# Patient Record
Sex: FEMALE | Race: OTHER | NOT HISPANIC OR LATINO | URBAN - METROPOLITAN AREA
[De-identification: names, ages, dates, MRNs, and addresses within clinical notes are randomized per-mention and may not be internally consistent; named-entity substitution may affect disease eponyms.]

---

## 2023-09-30 ENCOUNTER — EMERGENCY (EMERGENCY)
Facility: HOSPITAL | Age: 33
LOS: 1 days | Discharge: HOME | End: 2023-09-30
Attending: EMERGENCY MEDICINE | Admitting: EMERGENCY MEDICINE
Payer: MEDICAID

## 2023-09-30 VITALS
RESPIRATION RATE: 18 BRPM | TEMPERATURE: 99 F | OXYGEN SATURATION: 100 % | SYSTOLIC BLOOD PRESSURE: 117 MMHG | DIASTOLIC BLOOD PRESSURE: 56 MMHG | HEART RATE: 79 BPM

## 2023-09-30 LAB
ALBUMIN SERPL ELPH-MCNC: 4.2 G/DL — SIGNIFICANT CHANGE UP (ref 3.3–5)
ALP SERPL-CCNC: 48 U/L — SIGNIFICANT CHANGE UP (ref 40–120)
ALT FLD-CCNC: 8 U/L — SIGNIFICANT CHANGE UP (ref 4–33)
ANION GAP SERPL CALC-SCNC: 9 MMOL/L — SIGNIFICANT CHANGE UP (ref 7–14)
AST SERPL-CCNC: 12 U/L — SIGNIFICANT CHANGE UP (ref 4–32)
BASOPHILS # BLD AUTO: 0.07 K/UL — SIGNIFICANT CHANGE UP (ref 0–0.2)
BASOPHILS NFR BLD AUTO: 0.9 % — SIGNIFICANT CHANGE UP (ref 0–2)
BILIRUB SERPL-MCNC: <0.2 MG/DL — SIGNIFICANT CHANGE UP (ref 0.2–1.2)
BLD GP AB SCN SERPL QL: NEGATIVE — SIGNIFICANT CHANGE UP
BUN SERPL-MCNC: 10 MG/DL — SIGNIFICANT CHANGE UP (ref 7–23)
CALCIUM SERPL-MCNC: 8.6 MG/DL — SIGNIFICANT CHANGE UP (ref 8.4–10.5)
CHLORIDE SERPL-SCNC: 107 MMOL/L — SIGNIFICANT CHANGE UP (ref 98–107)
CO2 SERPL-SCNC: 21 MMOL/L — LOW (ref 22–31)
CREAT SERPL-MCNC: 0.6 MG/DL — SIGNIFICANT CHANGE UP (ref 0.5–1.3)
EGFR: 123 ML/MIN/1.73M2 — SIGNIFICANT CHANGE UP
EOSINOPHIL # BLD AUTO: 0.31 K/UL — SIGNIFICANT CHANGE UP (ref 0–0.5)
EOSINOPHIL NFR BLD AUTO: 3.9 % — SIGNIFICANT CHANGE UP (ref 0–6)
GLUCOSE SERPL-MCNC: 109 MG/DL — HIGH (ref 70–99)
HCG SERPL-ACNC: <1 MIU/ML — SIGNIFICANT CHANGE UP
HCT VFR BLD CALC: 17.6 % — CRITICAL LOW (ref 34.5–45)
HGB BLD-MCNC: 5.2 G/DL — CRITICAL LOW (ref 11.5–15.5)
IANC: 4.08 K/UL — SIGNIFICANT CHANGE UP (ref 1.8–7.4)
IMM GRANULOCYTES NFR BLD AUTO: 0.3 % — SIGNIFICANT CHANGE UP (ref 0–0.9)
LYMPHOCYTES # BLD AUTO: 2.69 K/UL — SIGNIFICANT CHANGE UP (ref 1–3.3)
LYMPHOCYTES # BLD AUTO: 34.3 % — SIGNIFICANT CHANGE UP (ref 13–44)
MCHC RBC-ENTMCNC: 15.7 PG — LOW (ref 27–34)
MCHC RBC-ENTMCNC: 29.5 GM/DL — LOW (ref 32–36)
MCV RBC AUTO: 53 FL — LOW (ref 80–100)
MONOCYTES # BLD AUTO: 0.68 K/UL — SIGNIFICANT CHANGE UP (ref 0–0.9)
MONOCYTES NFR BLD AUTO: 8.7 % — SIGNIFICANT CHANGE UP (ref 2–14)
NEUTROPHILS # BLD AUTO: 4.08 K/UL — SIGNIFICANT CHANGE UP (ref 1.8–7.4)
NEUTROPHILS NFR BLD AUTO: 51.9 % — SIGNIFICANT CHANGE UP (ref 43–77)
NRBC # BLD: 0 /100 WBCS — SIGNIFICANT CHANGE UP (ref 0–0)
NRBC # FLD: 0 K/UL — SIGNIFICANT CHANGE UP (ref 0–0)
PLATELET # BLD AUTO: 266 K/UL — SIGNIFICANT CHANGE UP (ref 150–400)
POTASSIUM SERPL-MCNC: 3.9 MMOL/L — SIGNIFICANT CHANGE UP (ref 3.5–5.3)
POTASSIUM SERPL-SCNC: 3.9 MMOL/L — SIGNIFICANT CHANGE UP (ref 3.5–5.3)
PROT SERPL-MCNC: 7.1 G/DL — SIGNIFICANT CHANGE UP (ref 6–8.3)
RBC # BLD: 3.32 M/UL — LOW (ref 3.8–5.2)
RBC # FLD: 22 % — HIGH (ref 10.3–14.5)
RH IG SCN BLD-IMP: POSITIVE — SIGNIFICANT CHANGE UP
RH IG SCN BLD-IMP: POSITIVE — SIGNIFICANT CHANGE UP
SODIUM SERPL-SCNC: 137 MMOL/L — SIGNIFICANT CHANGE UP (ref 135–145)
WBC # BLD: 7.85 K/UL — SIGNIFICANT CHANGE UP (ref 3.8–10.5)
WBC # FLD AUTO: 7.85 K/UL — SIGNIFICANT CHANGE UP (ref 3.8–10.5)

## 2023-09-30 PROCEDURE — 99291 CRITICAL CARE FIRST HOUR: CPT

## 2023-09-30 PROCEDURE — 93010 ELECTROCARDIOGRAM REPORT: CPT

## 2023-09-30 NOTE — ED CDU PROVIDER INITIAL DAY NOTE - OBJECTIVE STATEMENT
Patient is a 32-year-old female with no pertinent past medical history sent to the emergency department for anemia.  Patient reports she has been having prolonged and heavier than usual menstrual periods for the past 6 months.  She reports last menstrual period started on September 8 and finished on September 17.  Since then she has had tiredness.  She reports evaluation with her OB/GYN with whom she had an ultrasound yesterday.  Patient was found to have a low hemoglobin for which she was directed to the emergency department.  She denies any fevers, chills, chest pain, shortness of breath, syncope, nausea, vomiting, melena, bright blood per rectum, use of blood thinners.

## 2023-09-30 NOTE — ED CDU PROVIDER INITIAL DAY NOTE - CLINICAL SUMMARY MEDICAL DECISION MAKING FREE TEXT BOX
Patient is a 32-year-old female with no pertinent past medical history sent to the emergency department for anemia.  Patient reports she has been having prolonged and heavier than usual menstrual periods for the past 6 months.  She reports last menstrual period started on September 8 and finished on September 17.  Since then she has had tiredness.  She reports evaluation with her OB/GYN with whom she had an ultrasound yesterday.  Patient was found to have a low hemoglobin for which she was directed to the emergency department.  She denies any fevers, chills, chest pain, shortness of breath, syncope, nausea, vomiting, melena, bright blood per rectum, use of blood thinners.  This is a patient with anemia likely in the context of heavy menstrual periods.  Patient was placed in the observation for blood transfusion.  Plan to recheck CBC upon completion of blood transfusion.

## 2023-09-30 NOTE — ED ADULT TRIAGE NOTE - CHIEF COMPLAINT QUOTE
pt sent to ED form PMD.  pt c/o weakness and fatigue x a few months.  pt had blood work done last week and was found to have low hgb.  5.3.  pt states she has heavy periods but is not on her period now

## 2023-09-30 NOTE — ED ADULT NURSE REASSESSMENT NOTE - NS ED NURSE REASSESS COMMENT FT1
Pt is A&OX4, ambulatory,  denies any chest pain, SOB, dizziness, weakness, nausea. reported mild right shoulder pain, refused any pain med. warm pack applied. no distress noted. blood transfusion started, no advertise reaction noted.  fall precautions maintained.  safety maintained. will continue to monitor

## 2023-09-30 NOTE — ED PROVIDER NOTE - CRITICAL CARE ATTENDING CONTRIBUTION TO CARE
I have evaluated the patient and agree with the documentation and assessment as made by the PA. We have discussed plan of care and work up for the patient.  I performed all elements of critical care.     Brief HPI:  32-year-old female with no medical history, history of heavy menstrual period, sent in by PCP for low hemoglobin.  Denies current bleeding.  Reports fatigue.  Denies bloody or dark stool.      Vitals:   Reviewed    Exam:    GEN:  Non-toxic appearing, non-distressed, speaking full sentences, non-diaphoretic, AAOx3  HEENT:  NCAT, neck supple, EOMI, PERRLA, sclera anicteric, no conjunctival pallor or injection, no stridor, normal voice, no tonsillar exudate, uvula midline  CV:  regular rhythm and rate, s1/s2 audible, no murmurs, rubs or gallops, peripheral pulses 2+ and symmetric  PULM:  non-labored respirations, lungs clear to auscultation bilaterally, no wheezes, crackles or rales  ABD:  non distended, non-tender, no rebound, no guarding, negative Shrestha's sign, bowel sounds normal, no cvat  MSK:  no gross deformity, non-tender extremities and joints, range of motion grossly normal appearing, no extremity edema, extremities warm and well perfused   NEURO:  AAOx3, CN II-XII intact, motor 5/5 in upper and lower extremities bilaterally, sensation grossly intact in extremities and trunk, finger to nose testing wnl, no nystagmus, negative Romberg, no pronator drift, no gait deficit  SKIN:  warm, dry, no rash or vesicles     A/P:   32-year-old female with no medical history, history of heavy menstrual period, sent in by PCP for low hemoglobin.   Suspect gyn source, although patient denies current bleeding.  Will send labs, possible prbc transfusion.  Dispo pending.

## 2023-09-30 NOTE — ED PROVIDER NOTE - OBJECTIVE STATEMENT
32-year-old female with no medical history, history of heavy menstrual period, sent in by PCP for low hemoglobin.  Outpatient work-up 1 week ago showed hemoglobin of 5.3 outpatient.  Patient feels generalized weakness and fatigue.  LMP September 8.  Denies active bleeding right now.  Denies chest pain, shortness of breath, nausea/vomiting.

## 2023-09-30 NOTE — ED CDU PROVIDER INITIAL DAY NOTE - ATTENDING APP SHARED VISIT CONTRIBUTION OF CARE
I have evaluated the patient and agree with the documentation and assessment as made by the PA. We have discussed plan of care and work up for the patient.    Exam:    GEN:  Non-toxic appearing, non-distressed, speaking full sentences, non-diaphoretic, AAOx3  HEENT:  NCAT, neck supple, EOMI, PERRLA, sclera anicteric, no conjunctival pallor or injection, no stridor, normal voice, no tonsillar exudate, uvula midline  CV:  regular rhythm and rate, s1/s2 audible, no murmurs, rubs or gallops, peripheral pulses 2+ and symmetric  PULM:  non-labored respirations, lungs clear to auscultation bilaterally, no wheezes, crackles or rales  ABD:  non distended, non-tender, no rebound, no guarding, negative Shrestha's sign, bowel sounds normal, no cvat  MSK:  no gross deformity, non-tender extremities and joints, range of motion grossly normal appearing, no extremity edema, extremities warm and well perfused   NEURO:  AAOx3, CN II-XII intact, motor 5/5 in upper and lower extremities bilaterally, sensation grossly intact in extremities and trunk, finger to nose testing wnl, no nystagmus, negative Romberg, no pronator drift, no gait deficit  SKIN:  warm, dry, no rash or vesicles

## 2023-09-30 NOTE — ED ADULT NURSE NOTE - OBJECTIVE STATEMENT
Patient is awake and alert ,her  at her bedside. Patient has a h/o fibroids and " heavy bleeding with her periods".  Patient denies any pain , appears in no distress. Patient is awake and alert ,her  at her bedside. Patient has a h/o fibroids and " heavy bleeding with her periods".  Patient denies any pain , appears in no distress. IV placed , labs sent.

## 2023-09-30 NOTE — ED ADULT NURSE REASSESSMENT NOTE - NS ED NURSE REASSESS COMMENT FT1
Patient awake and alert, her  at her bedside. Patient awaiting blood transfusion, transferred to cdu in stable condition.

## 2023-09-30 NOTE — ED PROVIDER NOTE - CLINICAL SUMMARY MEDICAL DECISION MAKING FREE TEXT BOX
pt here for low hb outpatient with some weakness. exam found pt well appearing. VSS. abd soft, non-tender. pt denies active bleeding.   will repeat cbc. plan for transfusion.

## 2023-10-01 VITALS
HEART RATE: 65 BPM | TEMPERATURE: 98 F | OXYGEN SATURATION: 100 % | SYSTOLIC BLOOD PRESSURE: 109 MMHG | RESPIRATION RATE: 18 BRPM | DIASTOLIC BLOOD PRESSURE: 63 MMHG

## 2023-10-01 LAB
HCT VFR BLD CALC: 28.9 % — LOW (ref 34.5–45)
HGB BLD-MCNC: 9 G/DL — LOW (ref 11.5–15.5)
MCHC RBC-ENTMCNC: 19.7 PG — LOW (ref 27–34)
MCHC RBC-ENTMCNC: 31.1 GM/DL — LOW (ref 32–36)
MCV RBC AUTO: 63.2 FL — LOW (ref 80–100)
NRBC # BLD: 0 /100 WBCS — SIGNIFICANT CHANGE UP (ref 0–0)
NRBC # FLD: 0.02 K/UL — HIGH (ref 0–0)
PLATELET # BLD AUTO: 235 K/UL — SIGNIFICANT CHANGE UP (ref 150–400)
RBC # BLD: 4.57 M/UL — SIGNIFICANT CHANGE UP (ref 3.8–5.2)
RBC # FLD: 30.9 % — HIGH (ref 10.3–14.5)
WBC # BLD: 9 K/UL — SIGNIFICANT CHANGE UP (ref 3.8–10.5)
WBC # FLD AUTO: 9 K/UL — SIGNIFICANT CHANGE UP (ref 3.8–10.5)

## 2023-10-01 PROCEDURE — 99239 HOSP IP/OBS DSCHRG MGMT >30: CPT

## 2023-10-01 NOTE — ED CDU PROVIDER SUBSEQUENT DAY NOTE - ATTENDING APP SHARED VISIT CONTRIBUTION OF CARE
Attending note:   After face to face evaluation of this patient, I concur with above noted hx, pe, and care plan for this patient.  Gardiner: 32-year-old female with menorrhagia for the last 6 months.  Patient presented to the ED after seeing GYN last week.  Patient last had vaginal bleeding 2 weeks ago.  Patient in ED was noted to have hemoglobin of 5.2.  Patient's only complaint was generalized fatigue.  Patient placed in CDU for 3 units of packed red blood cells.  This is finishing now at approximately 9:30 AM.  Patient states that her symptoms have overall improved and she is less fatigued.  Patient has no chest pain or shortness of breath.  Patient's vital signs are stable.  Patient is in no distress.  Patient is not pale.  Lungs are clear and heart is regular rate and rhythm.  Abdomen was soft with no significant tenderness noted.  Patient has GYN to follow-up with.  We will repeat CBC and if greater than 7 patient can be discharged. Patient can also be discharged on iron pills for extra supplementation until follow-up with GYN.

## 2023-10-01 NOTE — ED CDU PROVIDER SUBSEQUENT DAY NOTE - PROGRESS NOTE DETAILS
YANI Verduzco: 32-year-old female with a history of heavy menses presented to the emergency department due to low H&H on outpatient labs.  Patient sent to CDU for blood transfusion.  Patient states she has a prescription for iron from her doctor which she will start taking.  Patient feels well, ambulating without difficulty, tolerating p.o., patient wants to go home.  H&H improved from 5.2/17.6-9.0/28.9.  Discharge and results reviewed with patient,  at bedside, conducted via  ID number [id number].

## 2023-10-01 NOTE — ED CDU PROVIDER DISPOSITION NOTE - NSFOLLOWUPINSTRUCTIONS_ED_ALL_ED_FT
Follow up with your Primary Medical Doctor in 1-2 days.  Follow-up with your gynecologist in 1 to 2 days.  Take iron as prescribed by your doctor.  Rest stay well-hydrated.  Return to the emergency department for any persistent/worsening or new symptoms, chest pain, shortness of breath, bleeding, weakness, dizziness or any concerning symptoms.

## 2023-10-01 NOTE — ED CDU PROVIDER DISPOSITION NOTE - CLINICAL COURSE
YANI Verduzco: 32-year-old female with a history of heavy menses presented to the emergency department due to low H&H on outpatient labs.  Patient sent to CDU for blood transfusion.  Patient states she has a prescription for iron from her doctor which she will start taking.  Patient feels well, ambulating without difficulty, tolerating p.o., patient wants to go home.  H&H improved from 5.2/17.6-9.0/28.9.  Discharge and results reviewed with patient,  at bedside, conducted via  ID number [id number] Verduzco: 32-year-old female with a history of heavy menses presented to the emergency department due to low H&H on outpatient labs.  Patient sent to CDU for blood transfusion.  Patient states she has a prescription for iron from her doctor which she will start taking.  Patient feels well, ambulating without difficulty, tolerating p.o., patient wants to go home.  H&H improved from 5.2/17.6-9.0/28.9.  Discharge and results reviewed with patient,  at bedside, conducted via  ID number [id number].

## 2023-10-01 NOTE — ED CDU PROVIDER DISPOSITION NOTE - PATIENT PORTAL LINK FT
You can access the FollowMyHealth Patient Portal offered by [facility] by registering at the following website: [email or web address]. By joining [facility]’s FollowMyHealth portal, you will also be able to view your health information using other applications (apps) compatible with our system.

## 2023-10-01 NOTE — ED CDU PROVIDER SUBSEQUENT DAY NOTE - HISTORY
31 yo female, no stated PMH though hx/o more prolonged and heavier than usual menstrual periods for the past 6 months; pt was directed to the ED by outpatient Ob/GYN for anemia.  Pt staes LMP 9/8 - 9/17/23; since then, pt has had generalized fatigue.  She saw her OB/GYN and had ultrasound 9/29/23.  Patient was also found to have a low hemoglobin for which she was directed to the ED.  In the ED, VSS, pt afebrile.  SBPs in the ED ranged ; DBPs ranged 56-58 bpm; pt asymptomatic.  WBC 7.85, Hb 5.2, Hct 17.6, platelets 266.  CMP unremarkable.  HCG <1.0.  Pt was sent to CDU for transfusion of PRBCs.  In the interim, pt objectively noted to be resting comfortably; pt has been clinically stable; no issues thus far.

## 2023-10-01 NOTE — ED CDU PROVIDER SUBSEQUENT DAY NOTE - PHYSICAL EXAMINATION
CONSTITUTIONAL:  Well appearing, awake, alert, oriented to person, place, time/situation and in no apparent distress.  Pt. is objectively comfortable appearing and verbalizing in full, clear, effortless sentences.  ENMT: NC/AT.  Airway patent.  Nasal mucosa clear.  Moist mucous membranes.  Neck supple.  EYES:  Clear OU.  CARDIAC:  Normal rate, regular rhythm.  Heart sounds S1 S2.  No murmurs, gallops, or rubs.  RESPIRATORY:  Breath sounds clear and equal bilaterally.  No wheezes, no rales, no rhonchi.  GASTROINTESTINAL:  Abdomen soft, non-distended, non-tender.  No rebound, no guarding.  NEUROLOGICAL:  Alert and oriented to person/place/time/situation.  No focal deficits; no tremors noted.   MUSCULOSKELETAL:  Range of motion is not limited.     SKIN:  Skin color unremarkable.  Skin warm, dry, and intact.    PSYCHIATRIC:  Alert and oriented to person/place/time/situation.  Mood and affect WNL.  No apparent risk to self or others.

## 2023-10-18 PROBLEM — N92.0 EXCESSIVE AND FREQUENT MENSTRUATION WITH REGULAR CYCLE: Chronic | Status: ACTIVE | Noted: 2023-10-01

## 2023-10-18 PROBLEM — D64.9 ANEMIA, UNSPECIFIED: Chronic | Status: ACTIVE | Noted: 2023-10-01

## 2023-11-01 PROBLEM — Z00.00 ENCOUNTER FOR PREVENTIVE HEALTH EXAMINATION: Status: ACTIVE | Noted: 2023-11-01

## 2023-11-06 ENCOUNTER — APPOINTMENT (OUTPATIENT)
Dept: OBGYN | Facility: HOSPITAL | Age: 33
End: 2023-11-06